# Patient Record
Sex: FEMALE | Race: WHITE | NOT HISPANIC OR LATINO | Employment: FULL TIME | URBAN - METROPOLITAN AREA
[De-identification: names, ages, dates, MRNs, and addresses within clinical notes are randomized per-mention and may not be internally consistent; named-entity substitution may affect disease eponyms.]

---

## 2017-06-06 ENCOUNTER — ALLSCRIPTS OFFICE VISIT (OUTPATIENT)
Dept: OTHER | Facility: OTHER | Age: 37
End: 2017-06-06

## 2017-06-06 DIAGNOSIS — N92.6 IRREGULAR MENSTRUATION: ICD-10-CM

## 2017-10-16 ENCOUNTER — ALLSCRIPTS OFFICE VISIT (OUTPATIENT)
Dept: OTHER | Facility: OTHER | Age: 37
End: 2017-10-16

## 2017-10-16 LAB
BACTERIA UR QL AUTO: NORMAL
CLUE CELL (HISTORICAL): NORMAL
HYPHAL YEAST (HISTORICAL): NEGATIVE
KOH PREP (HISTORICAL): NEGATIVE
PH FLUID (HISTORICAL): NORMAL
PH UR STRIP.AUTO: NORMAL [PH]
TRICHOMONAS (HISTORICAL): NORMAL
YEAST (HISTORICAL): NEGATIVE

## 2017-10-18 ENCOUNTER — LAB CONVERSION - ENCOUNTER (OUTPATIENT)
Dept: OTHER | Facility: OTHER | Age: 37
End: 2017-10-18

## 2017-10-18 LAB — CLINICAL COMMENT (HISTORICAL): NORMAL

## 2017-11-01 NOTE — PROGRESS NOTES
Assessment    1  Bacterial vaginosis (616 10,041 9) (N76 0,B96 89)   2  Contact with and (suspected) exposure to infections with a predominantly sexual mode of transmission (V01 6) (Z20 2)    Plan  Bacterial vaginosis    · MetroNIDAZOLE 500 MG Oral Tablet; TAKE 1 TABLET TWICE DAILY UNTILFINISHED   Rx By: Keturah Mccann; Dispense: 7 Days ; #:14 Tablet; Refill: 0;For: Bacterial vaginosis; JIMY = N; Sent To: [facility]/PHARMACY #[id number]  [facility]; Status:Complete;   Done: 49GPC7710 12:00PM   Performed: In Office; JQM:66ERQ6281;POWTRDP; For:Bacterial vaginosis; Ordered By:Maria Ines Rosario;   · [address] [facility]; Status:Complete;   Done: 98MNQ7587 12:00PM   Performed: In Office; Due:2017;Ordered; Today;vaginosis; Ordered By:Maria Ines Rosario;   · Follow-up PRN Evaluation and Treatment  Follow-up  Status: Complete  Done:2017 12:00PM   Ordered; Bacterial vaginosis; Ordered By: Keturah Mccann Performed:  Due: 83LWC6220  Contact with and (suspected) exposure to infections with a predominantly sexual modeof transmission    · (Q) CHLAM/N GONO PROBE W/REFL ENDOCX OR MALE URETHRA; Status:Active; Requested RPP:[id number];    Perform:[facility]; Due:2018; Ordered;with and (suspected) exposure to infections with a predominantly sexual mode of transmission; Ordered By:Maria Ines Rosario;   · Using a latex condom can help prevent pregnancy  It can also help to prevent the spreadof sexually transmitted infections ; Status:Complete;   Done: 61WXR1610 12:00PM   Ordered;with and (suspected) exposure to infections with a predominantly sexual mode of transmission; Ordered By:Maria Ines Rosario;    Discussion/Summary  Goals and Barriers: The patient has the current Goals: Resolutions of symptoms  The patent has the current Barriers: None  Patient's Capacity to Self-Care: Patient is able to Self-Care        Chief Complaint  Chief Complaint Free Text Note Form: I think I have an infection      History of Present Illness  HPI: Pt is a 40 y o   with LMP 10/2/2017 who presents for evaluation secondary to vaginal discharge  She reports she has a white vaginal discharge  She reports it has a slight odor  She reports it smells musty  SHe reports she has a new sexual partner x 4 weeks  SHe reports no condom use  She denies any itching or burning  She denies fevers/chills  She denies pain with intercourse  SHe is concerned she may have an infection or sti  Review of Systems  Focused-Female:  Constitutional: No fever, no chills, feels well, no tiredness, no recent weight gain or loss  ENT: no ear ache, no loss of hearing, no nosebleeds or nasal discharge, no sore throat or hoarseness  Cardiovascular: no complaints of slow or fast heart rate, no chest pain, no palpitations, no leg claudication or lower extremity edema  Respiratory: no complaints of shortness of breath, no wheezing, no dyspnea on exertion, no orthopnea or PND  Breasts: no complaints of breast pain, breast lump or nipple discharge  Gastrointestinal: no complaints of abdominal pain, no constipation, no nausea or diarrhea, no vomiting, no bloody stools  Genitourinary: as noted in HPI  Musculoskeletal: no complaints of arthralgia, no myalgia, no joint swelling or stiffness, no limb pain or swelling  Integumentary: no complaints of skin rash or lesion, no itching or dry skin, no skin wounds  Neurological: no complaints of headache, no confusion, no numbness or tingling, no dizziness or fainting  Active Problems  1  Cervical cancer screening (V76 2) (Z12 4)   2  Depression (311) (F32 9)   3  Dietary calcium deficiency (269 3) (E58)   4  Irregular menstrual bleeding (626 4) (N92 6)   5  History of Macrosomia (766 0) (P08 0)   6  Oral contraceptive pill surveillance (V25 41) (Z30 41)   7  Overweight (BMI 25 0-29 9) (278 02) (E66 3)   8  PCOS (polycystic ovarian syndrome) (256 4) (E28 2)   9  Well female exam with routine gynecological exam (V72 31) (Z01 419)    Past Medical History    1  Denied: History of Abnormal Pap smear of cervix   2  History of Common migraine without aura (346 10) (G43 009)   3  History of Genetic screening (V82 79) (Z13 79)   4  Denied: History of Herpes simplex   5  History of metrorrhagia (V13 29) (Z87 42)   6  History of pregnancy (V13 29)   7  History of Macrosomia (766 0) (P08 0)   8  History of Menarche (V21 8)   9  History of Obesity (BMI 30 0-34 9) (278 00) (E66 9)   10  History of Post partum depression (864 80,214) (F53)   11  History of Varicella (052 9) (B01 9)  Active Problems And Past Medical History Reviewed: The active problems and past medical history were reviewed and updated today  Surgical History  1  History of Breast Surgery Reduction Procedure   2  History of Cholecystectomy   3  History of Dilation And Curettage   4  History of Surgically Induced   Surgical History Reviewed: The surgical history was reviewed and updated today  Family History  Mother    1  Family history of Hypertension   2  Family history of Kidney stones  Father    3  Family history of Hypercholesterolemia   4  Family history of Hypertension  Maternal Grandmother    5  Family history of Chronic kidney disease   6  Family history of Type 2 diabetes mellitus  Maternal Grandfather    7  Family history of Cardiac ischemia   8  Family history of Stroke   9  Family history of Type 2 diabetes mellitus  Maternal Great Grandfather    10  Family history of Kidney stones  Maternal Relatives    11  Family history of Throat cancer  Family History Reviewed: The family history was reviewed and updated today  Social History     · Alcohol use (V49 89) (Z78 9)   ·    · Exercise habits   · Former smoker (V15 82) (W16 325)   · High school graduate   · No drug use   · No preference on Mormonism beliefs   · Occupation  Social History Reviewed: The social history was reviewed and updated today  The social history was reviewed and is unchanged  Current Meds   1  Imitrex 50 MG Oral Tablet (SUMAtriptan Succinate); Therapy: (Recorded:06Jun2017) to Recorded   2  Sprintec 28 0 25-35 MG-MCG Oral Tablet; TAKE 1 TABLET DAILY; Therapy: 94WFC6594 to (Tacy Pound)  Requested for: 63CVO0246; Last Rx:06Jun2017 Ordered   3  Topamax 50 MG Oral Tablet (Topiramate); TAKE 1 TABLET TWICE DAILY; Therapy: (Recorded:06Jun2017) to Recorded   4  Wellbutrin  MG Oral Tablet Extended Release 24 Hour (BuPROPion HCl ER (XL)); Therapy: (Recorded:06Jun2017) to Recorded    Allergies  1  Augmentin    Vitals  Vital Signs    Recorded: 30OVM9998 45:54EP   Systolic 028, Sitting   Diastolic 80, Sitting   Height 5 ft 4 5 in   Weight 173 lb    BMI Calculated 29 24   BSA Calculated 1 85   LMP 02Oct2017       Physical Exam   Constitutional  General appearance: No acute distress, well appearing and well nourished  Neck  Neck: Normal, supple, trachea midline, no masses  Pulmonary  Respiratory effort: No increased work of breathing or signs of respiratory distress  Genitourinary  External genitalia: Normal and no lesions appreciated  Vagina: Normal, no lesions or dryness appreciated  Vagina: moderate,-- white-- and-- thin vaginal discharge, but-- normal   Urethra: Normal    Urethral meatus: Normal    Bladder: Normal, soft, non-tender and no prolapse or masses appreciated  Cervix: Normal, no palpable masses  Examination of the cervix revealed normal findings,-- no polyps-- and-- no masses  Uterus: Normal, non-tender, not enlarged, and no palpable masses  Adnexa/parametria: Normal, non-tender and no fullness or masses appreciated  Abdomen  Abdomen: Normal, non-tender, and no organomegaly noted  Liver and spleen: No hepatomegaly or splenomegaly  Examination for hernias: No hernias appreciated  Skin  Skin and subcutaneous tissue: Normal skin turgor and no rashes     Psychiatric  Orientation to person, place, and time: Normal    Mood and affect: Normal        Signatures   Electronically signed by : MARTHA Castillo ; Oct 16 2017 12:01PM EST                       (Author)

## 2017-12-08 ENCOUNTER — ALLSCRIPTS OFFICE VISIT (OUTPATIENT)
Dept: OTHER | Facility: OTHER | Age: 37
End: 2017-12-08

## 2017-12-08 LAB
BACTERIA UR QL AUTO: NORMAL
CLUE CELL (HISTORICAL): NORMAL
HYPHAL YEAST (HISTORICAL): NEGATIVE
KOH PREP (HISTORICAL): NORMAL
PH FLUID (HISTORICAL): NORMAL
TRICHOMONAS (HISTORICAL): NEGATIVE
YEAST (HISTORICAL): NEGATIVE

## 2017-12-09 NOTE — PROGRESS NOTES
Assessment  1  Bacterial vaginosis (616 10,041 9) (N76 0,B96 89)    Plan  Bacterial vaginosis    · Tinidazole 500 MG Oral Tablet; Take 2 tablets once daily x 5 days   Rx By: Audrey Vinson; Dispense: 0 Days ; #:10 Tablet; Refill: 0;For: Bacterial vaginosis; JIMY = N; Sent To: [facility]/PHARMACY #[id number]  · [address] 30 Body Fluid - POC; Status:Complete;   Done: 45LJB1616 11:12AM   Performed: In Office; Due:39Ggd1805;Ordered; For:Bacterial vaginosis; Ordered By:Johnson Rosario;   · [address]; Status:Complete;   Done: 20WQJ6971 11:12AM   Performed: In Office; XYD:40RDR3453;SPPOZIR; Today;vaginosis; Ordered By:Johnson Rosario;   · Follow-up PRN Evaluation and Treatment  Follow-up  Status: Complete  Done:95Mnj4261 11:13AM   Ordered; Bacterial vaginosis; Ordered By: Audrey Vinson Performed:  Due: 64DSN2490    Discussion/Summary  Goals and Barriers: The patient has the current Goals: Resolution of sx  The patent has the current Barriers: None  Patient's Capacity to Self-Care: Patient is able to Self-Care  Chief Complaint  Chief Complaint Free Text Note Form: I think I have an infection      History of Present Illness  HPI: Pt is a 40 y o  with LMP 17 who presents for evaluation secondary to suspected infection  She reports after treatment for BV last time she developed what she thinks is a yeast infection  She self treated with Monistat and her symptoms resolved  She reports she had a normal discharge and things were fine until 2 week ago  she reports that she has noticed a slight odor to her discharge that is intermittent  She reports that she not noticed exacerbating or alleviating factors  She reports no other OTC treatments  she reports the discharge is white with a slight yellow tinge  She reports the it is sticky in nature  She denies any vaginal or vulvar itching or burning   She reports that her menses have been normal  She denies any new sexual partners      Review of Systems  Focused-Female:  Constitutional: No fever, no chills, feels well, no tiredness, no recent weight gain or loss  ENT: no ear ache, no loss of hearing, no nosebleeds or nasal discharge, no sore throat or hoarseness  Cardiovascular: no complaints of slow or fast heart rate, no chest pain, no palpitations, no leg claudication or lower extremity edema  Respiratory: no complaints of shortness of breath, no wheezing, no dyspnea on exertion, no orthopnea or PND  Breasts: no complaints of breast pain, breast lump or nipple discharge  Gastrointestinal: no complaints of abdominal pain, no constipation, no nausea or diarrhea, no vomiting, no bloody stools  Genitourinary: as noted in HPI  Musculoskeletal: no complaints of arthralgia, no myalgia, no joint swelling or stiffness, no limb pain or swelling  Integumentary: no complaints of skin rash or lesion, no itching or dry skin, no skin wounds  Neurological: no complaints of headache, no confusion, no numbness or tingling, no dizziness or fainting  Active Problems  1  Bacterial vaginosis (616 10,041 9) (N76 0,B96 89)   2  Cervical cancer screening (V76 2) (Z12 4)   3  Depression (311) (F32 9)   4  Dietary calcium deficiency (269 3) (E58)   5  History of Macrosomia (766 0) (P08 0)   6  Oral contraceptive pill surveillance (V25 41) (Z30 41)   7  Overweight (BMI 25 0-29 9) (278 02) (E66 3)   8  PCOS (polycystic ovarian syndrome) (256 4) (E28 2)   9  Well female exam with routine gynecological exam (V72 31) (Z01 419)    Past Medical History    1  Denied: History of Abnormal Pap smear of cervix   2  History of Common migraine without aura (346 10) (G43 009)   3  History of Contact with and (suspected) exposure to infections with a predominantly sexual mode of transmission (V01 6) (Z20 2)   4  History of Genetic screening (V82 79) (Z13 79)   5  Denied: History of Herpes simplex   6  History of irregular menstrual cycles (V13 29) (Z87 42)   7  History of metrorrhagia (V13 29) (Z87 42)   8   History of pregnancy (V13 29)   9  History of Macrosomia (766 0) (P08 0)   10  History of Menarche (V21 8)   11  History of Obesity (BMI 30 0-34 9) (278 00) (E66 9)   12  History of Post partum depression (130 87,976) (F53)   13  History of Varicella (052 9) (B01 9)  Active Problems And Past Medical History Reviewed: The active problems and past medical history were reviewed and updated today  Surgical History  1  History of Breast Surgery Reduction Procedure   2  History of Cholecystectomy   3  History of Dilation And Curettage   4  History of Surgically Induced   Surgical History Reviewed: The surgical history was reviewed and updated today  Family History  Mother    1  Family history of Hypertension   2  Family history of Kidney stones  Father    3  Family history of Hypercholesterolemia   4  Family history of Hypertension  Maternal Grandmother    5  Family history of Chronic kidney disease   6  Family history of Type 2 diabetes mellitus  Maternal Grandfather    7  Family history of Cardiac ischemia   8  Family history of Stroke   9  Family history of Type 2 diabetes mellitus  Maternal Great Grandfather    10  Family history of Kidney stones  Maternal Relatives    11  Family history of Throat cancer  Family History Reviewed: The family history was reviewed and updated today  Social History     · Alcohol use (V49 89) (Z78 9)   ·    · Exercise habits   · Former smoker (V15 82) (N63 638)   · High school graduate   · No drug use   · No preference on Baptism beliefs   · Occupation  Social History Reviewed: The social history was reviewed and updated today  The social history was reviewed and is unchanged  Current Meds   1  Imitrex 50 MG Oral Tablet (SUMAtriptan Succinate); Therapy: (Recorded:2017) to Recorded   2  Sprintec 28 0 25-35 MG-MCG Oral Tablet; TAKE 1 TABLET DAILY; Therapy: 29JIW2468 to (Susan Winston)  Requested for: 50MFI7957; Last Rx:2017 Ordered   3  Topamax 50 MG Oral Tablet (Topiramate); TAKE 1 TABLET TWICE DAILY; Therapy: (Recorded:06Jun2017) to Recorded   4  Wellbutrin  MG Oral Tablet Extended Release 24 Hour (BuPROPion HCl ER (XL)); Therapy: (Recorded:06Jun2017) to Recorded    Allergies  1  Augmentin    Vitals  Vital Signs    Recorded: 39ASI4888 38:55CS   Systolic 133, LUE, Sitting   Diastolic 74, LUE, Sitting   Height 5 ft 4 5 in   Weight 178 lb    BMI Calculated 30 08   BSA Calculated 1 87   LMP 75ODM8351       Physical Exam   Constitutional  General appearance: No acute distress, well appearing and well nourished  overweight  Neck  Neck: Normal, supple, trachea midline, no masses  Pulmonary  Respiratory effort: No increased work of breathing or signs of respiratory distress  Genitourinary  External genitalia: Normal and no lesions appreciated  Vagina: Normal, no lesions or dryness appreciated  Vagina: moderate,-- white-- and-- thin vaginal discharge, but-- normal   Urethra: Normal    Urethral meatus: Normal    Bladder: Normal, soft, non-tender and no prolapse or masses appreciated  Cervix: Normal, no palpable masses  Examination of the cervix revealed normal findings,-- no polyps-- and-- no masses  Uterus: Normal, non-tender, not enlarged, and no palpable masses  Adnexa/parametria: Normal, non-tender and no fullness or masses appreciated  Abdomen  Abdomen: Normal, non-tender, and no organomegaly noted  Liver and spleen: No hepatomegaly or splenomegaly  Examination for hernias: No hernias appreciated  Skin  Skin and subcutaneous tissue: Normal skin turgor and no rashes     Psychiatric  Orientation to person, place, and time: Normal    Mood and affect: Normal        Signatures   Electronically signed by : MARTHA Bertrand ; Dec  8 2017 11:15AM EST                       (Author)

## 2018-01-10 NOTE — PROGRESS NOTES
Active Problems    1  Bacterial vaginosis (616 10,041 9) (N76 0,B96 89)   2  Cervical cancer screening (V76 2) (Z12 4)   3  Depression (311) (F32 9)   4  Dietary calcium deficiency (269 3) (E58)   5  History of Macrosomia (766 0) (P08 0)   6  Obesity (BMI 30 0-34 9) (278 00) (E66 9)   7  Oral contraceptive pill surveillance (V25 41) (Z30 41)   8  PCOS (polycystic ovarian syndrome) (256 4) (E28 2)   9  Well female exam with routine gynecological exam (V72 31) (Z01 419)    Current Meds   1  Butalbital-APAP-Caffeine -40 MG Oral Tablet; Therapy: 47EVF5398 to Recorded   2  FLUoxetine HCl - 10 MG Oral Capsule; Therapy: 26AAZ4876 to Recorded   3  MetroNIDAZOLE 500 MG Oral Tablet; TAKE 1 TABLET TWICE DAILY UNTIL FINISHED,   no ETOH use during and for 2 days following treatment; Therapy: 67EHK7066 to (Evaluate:42Rte2645)  Requested for: 71HRT3860; Last   Rx:28Twn2550 Ordered   4  Sprintec 28 0 25-35 MG-MCG Oral Tablet; TAKE 1 TABLET DAILY; Therapy: 40VTS4370 to (Evaluate:00Oco8332)  Requested for: 46Fga8608; Last   Rx:61Cxu8320 Ordered    Allergies    1   Augmentin    Message   Date: 18 Jul 2016 10:24 AM EST, Recorded By: Damari Browne For: Yaquelin Willis   Caller: Myles Brady, Self   Phone: [phone] (Home), [phone] (Work)   Reason: Medical Complaint   Pt called the office stating that the infection has gone away but the outside area is raw and irritated    Tried monistat on outside got no relief      As per BEO to bring her in for appt      Pt will call back         Signatures   Electronically signed by : MARTHA Acuña ; Jul 18 2016  7:45PM EST                       (Co-participant)

## 2018-01-11 NOTE — RESULT NOTES
Verified Results  *(Q) SURESWAB(R), HSV TYPE 1/2 DNA, REAL TIME PCR 48NPH4768 12:00AM Makayla Siemens     Test Name Result Flag Reference   HSV 1 DNA Not Detected  Not Detected   HSV 2 DNA Not Detected  Not Detected   This test was developed and its analytical  performance characteristics have been determined  by [place], [place]  It has not been cleared or approved by the FDA  This  assay has been validated pursuant to the CLIA  regulations and is used for clinical purposes

## 2018-01-12 VITALS
SYSTOLIC BLOOD PRESSURE: 114 MMHG | HEIGHT: 65 IN | WEIGHT: 177 LBS | BODY MASS INDEX: 29.49 KG/M2 | DIASTOLIC BLOOD PRESSURE: 68 MMHG

## 2018-01-14 VITALS
WEIGHT: 173 LBS | SYSTOLIC BLOOD PRESSURE: 110 MMHG | HEIGHT: 65 IN | DIASTOLIC BLOOD PRESSURE: 80 MMHG | BODY MASS INDEX: 28.82 KG/M2

## 2018-01-15 NOTE — MISCELLANEOUS
Message  Patient is scheduled for her annual exam on 4/15/16  Pt requested one month refill of her o/c until that time  One month sent her pharmacy as requested  Pt is aware that no more refills will be given until she is seen  Active Problems    1  Cervical cancer screening (V76 2) (Z12 4)   2  Depression (311) (F32 9)   3  Dietary calcium deficiency (269 3) (E58)   4  History of Macrosomia (766 0) (P08 0)   5  Metrorrhagia (626 6) (N92 1)   6  Obesity (BMI 30 0-34 9) (278 00) (E66 9)   7  Oral contraceptive pill surveillance (V25 41) (Z30 41)   8  PCOS (polycystic ovarian syndrome) (256 4) (E28 2)   9  Well female exam with routine gynecological exam (V72 31) (Z01 419)    Current Meds   1  Butalbital-APAP-Caffeine -40 MG Oral Tablet; Therapy: 54IGU3134 to Recorded   2  FLUoxetine HCl - 10 MG Oral Capsule; Therapy: 28IAI3624 to Recorded   3  MetFORMIN HCl  MG Oral Tablet Extended Release 24 Hour; Therapy: 99RIM8179 to Recorded   4  Sprintec 28 0 25-35 MG-MCG Oral Tablet; TAKE 1 TABLET DAILY; Therapy: 55ZIR5558 to (([phone] 856-400)  Requested for: 24WGN0264; Last   Rx:26Jun2015 Ordered    Allergies    1   Augmentin    Plan  Metrorrhagia, Oral contraceptive pill surveillance    · Sprintec 28 0 25-35 MG-MCG Oral Tablet; TAKE 1 TABLET DAILY    Signatures   Electronically signed by : MARTHA Mcduffie ; Mar 30 2016  2:19PM EST                       (Author)

## 2018-01-15 NOTE — RESULT NOTES
Verified Results  (Q) TEST AUTHORIZATION 22HDS3653 12:00AM Kathy Michael     Test Name Result Flag Reference   TEST(S) ORDERED ON$REQUISITION      SURESWAB(R), HSV TYPE 1/2   TEST CODE: [id number]     CLIENT CONTACT: SUZY WILCOX     REPORT ALWAYS MESSAGE$SIGNATURE See Below     The laboratory testing on this patient was verbally requested  or confirmed by the ordering physician or his or her authorized  representative after contact with an employee of [employer]  Federal regulations require that we maintain on file written  authorization for all laboratory testing  Accordingly we are asking  that the ordering physician or his or her authorized representative  sign a copy of this report and promptly return it to the client    Signature:____________________________________________________     (Q) HERPES SIMPLEX VIRUS 1 AND 2, PCR 66HUV8685 12:00AM Kathy Michael     Test Name Result Flag Reference   SOURCE [place]     **********************************   * Test not performed  *   * No suitable specimen received   *   **********************************

## 2018-01-22 VITALS
WEIGHT: 178 LBS | BODY MASS INDEX: 29.66 KG/M2 | DIASTOLIC BLOOD PRESSURE: 74 MMHG | HEIGHT: 65 IN | SYSTOLIC BLOOD PRESSURE: 118 MMHG

## 2018-05-02 ENCOUNTER — TELEPHONE (OUTPATIENT)
Dept: OBGYN CLINIC | Facility: CLINIC | Age: 38
End: 2018-05-02

## 2018-05-02 NOTE — TELEPHONE ENCOUNTER
Patient called to schedule her annual exam and needs a refill of her OC Sprintec 0 25-35 MG-MCG to get her to her appointment which is scheduled for 07/02/2018  Sent one month supply and one refill to patients pharmacy in chart

## 2018-05-24 ENCOUNTER — OFFICE VISIT (OUTPATIENT)
Dept: OBGYN CLINIC | Facility: CLINIC | Age: 38
End: 2018-05-24
Payer: COMMERCIAL

## 2018-05-24 VITALS — WEIGHT: 177 LBS | DIASTOLIC BLOOD PRESSURE: 76 MMHG | SYSTOLIC BLOOD PRESSURE: 120 MMHG | BODY MASS INDEX: 29.91 KG/M2

## 2018-05-24 DIAGNOSIS — B96.89 BACTERIAL VAGINITIS: ICD-10-CM

## 2018-05-24 DIAGNOSIS — N76.0 BACTERIAL VAGINITIS: ICD-10-CM

## 2018-05-24 DIAGNOSIS — Z20.2 POSSIBLE EXPOSURE TO STD: ICD-10-CM

## 2018-05-24 DIAGNOSIS — N89.8 VAGINAL ODOR: ICD-10-CM

## 2018-05-24 DIAGNOSIS — N89.8 VAGINAL DISCHARGE: Primary | ICD-10-CM

## 2018-05-24 DIAGNOSIS — S30.854A: ICD-10-CM

## 2018-05-24 PROBLEM — N92.6 IRREGULAR MENSTRUAL BLEEDING: Status: ACTIVE | Noted: 2017-06-06

## 2018-05-24 PROBLEM — G43.009 MIGRAINE WITHOUT AURA AND WITHOUT STATUS MIGRAINOSUS, NOT INTRACTABLE: Status: ACTIVE | Noted: 2018-05-14

## 2018-05-24 PROBLEM — E66.3 OVERWEIGHT (BMI 25.0-29.9): Status: ACTIVE | Noted: 2017-06-06

## 2018-05-24 LAB — SL AMB POCT WET MOUNT: ABNORMAL

## 2018-05-24 PROCEDURE — 87210 SMEAR WET MOUNT SALINE/INK: CPT | Performed by: OBSTETRICS & GYNECOLOGY

## 2018-05-24 PROCEDURE — 99214 OFFICE O/P EST MOD 30 MIN: CPT | Performed by: OBSTETRICS & GYNECOLOGY

## 2018-05-24 RX ORDER — DICLOFENAC POTASSIUM 50 MG/1
TABLET, FILM COATED ORAL
COMMUNITY
Start: 2018-03-21

## 2018-05-24 RX ORDER — SUMATRIPTAN 100 MG/1
TABLET, FILM COATED ORAL
COMMUNITY
Start: 2018-05-04 | End: 2018-08-07

## 2018-05-24 RX ORDER — BUTALBITAL, ACETAMINOPHEN AND CAFFEINE 50; 325; 40 MG/1; MG/1; MG/1
1-2 TABLET ORAL EVERY 6 HOURS
COMMUNITY
Start: 2018-02-28 | End: 2018-05-24

## 2018-05-24 RX ORDER — CLINDAMYCIN PHOSPHATE 20 MG/G
1 CREAM VAGINAL
Qty: 40 G | Refills: 0 | Status: SHIPPED | OUTPATIENT
Start: 2018-05-24 | End: 2018-07-02 | Stop reason: CLARIF

## 2018-05-24 RX ORDER — NORGESTIMATE AND ETHINYL ESTRADIOL 0.25-0.035
1 KIT ORAL
COMMUNITY
Start: 2015-05-23 | End: 2018-06-22 | Stop reason: SDUPTHER

## 2018-05-24 RX ORDER — BUPROPION HYDROCHLORIDE 150 MG/1
150 TABLET ORAL
COMMUNITY
Start: 2017-12-04 | End: 2018-07-02 | Stop reason: CLARIF

## 2018-05-24 RX ORDER — SUMATRIPTAN 50 MG/1
TABLET, FILM COATED ORAL
COMMUNITY
End: 2018-05-24

## 2018-05-24 RX ORDER — CHOLECALCIFEROL (VITAMIN D3) 25 MCG
1000 CAPSULE ORAL
COMMUNITY

## 2018-05-24 RX ORDER — CEFADROXIL 500 MG/1
CAPSULE ORAL
COMMUNITY
Start: 2018-05-23 | End: 2018-07-02 | Stop reason: CLARIF

## 2018-05-24 NOTE — PROGRESS NOTES
Patient is a 45 y o  [id number] with Patient's last menstrual period was 05/15/2018 (exact date)  who presents requesting evaluation of suspected vaginal infection  She reports she was diagnosed with BV and was treated  She reports she completed therapy and then felt like she still had symptoms, so she saw her PCP who renewed her flagyl, but she developed severe migraines and had to go to the ED and they discontinued her flagyl and told her that "it will run its course"  She reports her symptoms never resolved so she tried multiple probiotics, rephresh, tea tree oil, and then she douched with peroxide with distilled water but she never got any improvement  She reports she also had a recent piercing that was misplaced and might need to be surgically removed  She reports her vaginal discharge is white  She reports it has an odor of fish  She denies vaginal itching  She reports vaginal burning  She denies fevers/chills  She reports she is currently on abx for a uti  She reports nausea      Past Medical History:   Diagnosis Date    Chickenpox     Migraine without aura     Oral herpes     PCOS (polycystic ovarian syndrome)        Past Surgical History:   Procedure Laterality Date    CHOLECYSTECTOMY      DILATION AND CURETTAGE, DIAGNOSTIC / THERAPEUTIC      miscarriage x 2    REDUCTION MAMMAPLASTY      WISDOM TOOTH EXTRACTION         OB History    Para Term  AB Living   4 2 2   2     SAB TAB Ectopic Multiple Live Births   2              # Outcome Date GA Lbr Izaiah/2nd Weight Sex Delivery Anes PTL Lv   4 SAB            3 SAB            2 Term            1 Term               Obstetric Comments   Menarche: 15       Gyn HX:  pcos and irregular menses      Current Outpatient Prescriptions:     buPROPion (WELLBUTRIN XL) 150 mg 24 hr tablet, Take 150 mg by mouth, Disp: , Rfl:     cefadroxil (DURICEF) 500 mg capsule, , Disp: , Rfl:     Cholecalciferol (VITAMIN D-3) 1000 units CAPS, Take 1,000 Units by mouth, Disp: , Rfl:     diclofenac potassium (CATAFLAM) 50 mg tablet, 50mg 1-2 times a day as needed for pain , Disp: , Rfl:     norgestimate-ethinyl estradiol (SPRINTEC 28) 0 25-35 MG-MCG per tablet, Take 1 tablet by mouth, Disp: , Rfl:     SUMAtriptan (IMITREX) 100 mg tablet, TAKE 1 TABLET AS NEEDED FOR MIGRAINE, MAY REPEAT IN 2 HOURS IF NEEDED  MAX OF 200MG/24 HRS, Disp: , Rfl:     Topiramate (TOPAMAX PO), Take by mouth, Disp: , Rfl:     clindamycin (CLEOCIN) 2 % vaginal cream, Insert 1 applicator into the vagina daily at bedtime, Disp: 40 g, Rfl: 0    Allergies   Allergen Reactions    Amoxicillin-Pot Clavulanate Hives    Metformin      Stomach Side effects       Social History     Social History    Marital status:      Spouse name: N/A    Number of children: 2    Years of education: hs, vocational     Occupational History          Social History Main Topics    Smoking status: Never Smoker    Smokeless tobacco: Never Used    Alcohol use 0 6 oz/week     1 Glasses of wine per week    Drug use: No    Sexual activity: Yes     Partners: Male     Birth control/ protection: Pill      Comment: lifetime partners: 7     Other Topics Concern    None     Social History Narrative    Catholic: no preference    Accepts blood products       Family History   Problem Relation Age of Onset    Hypertension Mother     Hypertension Father     Anxiety disorder Brother     Thyroid disease Maternal Grandmother     Hypertension Maternal Grandmother     Hyperlipidemia Maternal Grandmother     Diabetes Maternal Grandmother     Hypertension Maternal Grandfather     Hyperlipidemia Maternal Grandfather     Heart disease Maternal Grandfather        Review of Systems   Constitutional: Negative for chills, fatigue, fever and unexpected weight change  HENT: Negative for congestion, mouth sores and sore throat  Respiratory: Negative for cough, chest tightness, shortness of breath and wheezing  Cardiovascular: Negative for chest pain and palpitations  Gastrointestinal: Positive for nausea  Negative for abdominal distention, abdominal pain, constipation, diarrhea and vomiting  Endocrine: Negative for cold intolerance and heat intolerance  Genitourinary: Positive for dysuria, vaginal discharge and vaginal pain  Negative for dyspareunia, genital sores, menstrual problem, pelvic pain and vaginal bleeding  Musculoskeletal: Negative for arthralgias  Skin: Negative for color change and rash  Neurological: Negative for dizziness, light-headedness and headaches  Hematological: Negative for adenopathy  Blood pressure 120/76, weight 80 3 kg (177 lb), last menstrual period 05/15/2018, not currently breastfeeding  and Body mass index is 29 91 kg/m²  Physical Exam   Constitutional: She is oriented to person, place, and time  She appears well-developed and well-nourished  HENT:   Head: Normocephalic and atraumatic  Eyes: Conjunctivae and EOM are normal    Neck: Normal range of motion  Pulmonary/Chest: Effort normal    Abdominal: Soft  She exhibits no distension and no mass  There is no tenderness  There is no rebound and no guarding  Musculoskeletal: Normal range of motion  She exhibits no edema or tenderness  Neurological: She is alert and oriented to person, place, and time  Skin: Skin is warm  No rash noted  No erythema  Psychiatric: She has a normal mood and affect   Her behavior is normal  Judgment and thought content normal         vulva: normal external genitalia for age, no lesions, masses, epithelial changes, or exudate and entrapped foreign body in clitorus (piercing)  vagina: color pink, rugae  well formed rugae, discharge  yellow and odiferous and bleeding  without any bleeding  cervix: parous and no lesions   uterus: NSSC, AF, NT, mobile  adnexa: no masses or tenderness    Wet Mount/KOH Results:  Bacteria: few  Clue cells: many  Trichomonas: none  Yeast (with or without hyphae): absent  pH: blue  KOH: negative for yeas        A/P:  Pt is a 45 y o  [id number] with      Diagnoses and all orders for this visit:    Vaginal discharge  -     POCT wet mount  -     clindamycin (CLEOCIN) 2 % vaginal cream; Insert 1 applicator into the vagina daily at bedtime  -     Sureswab(R), Vaginosis/Vaginitis Plus    Vaginal odor  -     POCT wet mount  -     clindamycin (CLEOCIN) 2 % vaginal cream; Insert 1 applicator into the vagina daily at bedtime  -     Sureswab(R), Vaginosis/Vaginitis Plus    Possible exposure to STD  -     POCT wet mount  -     Sureswab(R), Vaginosis/Vaginitis Plus    Bacterial vaginitis  -     POCT wet mount  -     clindamycin (CLEOCIN) 2 % vaginal cream; Insert 1 applicator into the vagina daily at bedtime  -     Sureswab(R), Vaginosis/Vaginitis Plus    Foreign body in vulva, initial encounter      Schedule removal

## 2018-05-27 LAB
A VAGINAE DNA VAG NAA+PROBE-LOG#: 7.4 LOG (CELLS/ML)
C GLABRATA DNA VAG QL NAA+PROBE: NOT DETECTED
C TRACH RRNA SPEC QL NAA+PROBE: NOT DETECTED
CANDIDA DNA VAG QL NAA+PROBE: DETECTED
G VAGINALIS DNA VAG NAA+PROBE-LOG#: >8 LOG (CELLS/ML)
LACTOBACILLUS DNA VAG NAA+PROBE-LOG#: NOT DETECTED LOG (CELLS/ML)
MEGASPHAERA SP DNA VAG NAA+PROBE-LOG#: 7.9 LOG (CELLS/ML)
N GONORRHOEA RRNA SPEC QL NAA+PROBE: NOT DETECTED
SL AMB BV CATEGORY:: ABNORMAL
SL AMB C. PARAPSILOSIS, DNA: NOT DETECTED
SL AMB C. TROPICALIS, DNA: NOT DETECTED
T VAGINALIS RRNA SPEC QL NAA+PROBE: NOT DETECTED

## 2018-05-29 ENCOUNTER — TELEPHONE (OUTPATIENT)
Dept: OBGYN CLINIC | Facility: CLINIC | Age: 38
End: 2018-05-29

## 2018-05-29 DIAGNOSIS — N76.0 BACTERIAL VAGINITIS: ICD-10-CM

## 2018-05-29 DIAGNOSIS — B37.3 YEAST VAGINITIS: Primary | ICD-10-CM

## 2018-05-29 DIAGNOSIS — B96.89 BACTERIAL VAGINITIS: ICD-10-CM

## 2018-05-29 RX ORDER — FLUCONAZOLE 150 MG/1
150 TABLET ORAL ONCE
Qty: 1 TABLET | Refills: 0 | Status: SHIPPED | OUTPATIENT
Start: 2018-05-29 | End: 2018-05-29

## 2018-05-29 NOTE — TELEPHONE ENCOUNTER
Results received this morning   she also has a yeast infection  Rx sent to pharmacy in chart  If no improvement to call back  She has both bv and yeast  Thanks!

## 2018-05-29 NOTE — PROGRESS NOTES
Please notify the patient that she has both BV and yeast  She is negative for chlamydia, gonorrhea or trichomonas   Meds have been sent to the pharmacy in the patient's chart for yeast infection, she already received treatment for the BV

## 2018-06-01 ENCOUNTER — TELEPHONE (OUTPATIENT)
Dept: OBGYN CLINIC | Facility: CLINIC | Age: 38
End: 2018-06-01

## 2018-06-04 ENCOUNTER — TELEPHONE (OUTPATIENT)
Dept: OBGYN CLINIC | Facility: CLINIC | Age: 38
End: 2018-06-04

## 2018-06-04 DIAGNOSIS — B37.3 YEAST VAGINITIS: Primary | ICD-10-CM

## 2018-06-04 RX ORDER — FLUCONAZOLE 150 MG/1
150 TABLET ORAL ONCE
Qty: 1 TABLET | Refills: 0 | Status: SHIPPED | OUTPATIENT
Start: 2018-06-04 | End: 2018-06-04

## 2018-06-04 NOTE — TELEPHONE ENCOUNTER
I called the patient back  She reports that she feels that her BV has resolved but she still has significant vulvar pruritus  She was offered terconazole vs repeat diflucan  She reports she would like a second round of oral diflucan   Pt will call if symptoms persist

## 2018-06-04 NOTE — TELEPHONE ENCOUNTER
Per our discussion Friday, patient was noted to have a yeast infection as well  Pt was prescribed diflucan and she was to call back Monday in follow up  Thanks!

## 2018-06-22 DIAGNOSIS — Z30.41 ORAL CONTRACEPTIVE PILL SURVEILLANCE: Primary | ICD-10-CM

## 2018-07-02 ENCOUNTER — ANNUAL EXAM (OUTPATIENT)
Dept: OBGYN CLINIC | Facility: CLINIC | Age: 38
End: 2018-07-02
Payer: COMMERCIAL

## 2018-07-02 VITALS
WEIGHT: 170 LBS | BODY MASS INDEX: 28.32 KG/M2 | HEIGHT: 65 IN | SYSTOLIC BLOOD PRESSURE: 124 MMHG | DIASTOLIC BLOOD PRESSURE: 68 MMHG

## 2018-07-02 DIAGNOSIS — Z30.41 ORAL CONTRACEPTIVE PILL SURVEILLANCE: ICD-10-CM

## 2018-07-02 DIAGNOSIS — E66.3 OVERWEIGHT (BMI 25.0-29.9): ICD-10-CM

## 2018-07-02 DIAGNOSIS — N92.1 METRORRHAGIA: ICD-10-CM

## 2018-07-02 DIAGNOSIS — Z01.419 ENCOUNTER FOR ANNUAL ROUTINE GYNECOLOGICAL EXAMINATION: Primary | ICD-10-CM

## 2018-07-02 DIAGNOSIS — E58 DIETARY CALCIUM DEFICIENCY: ICD-10-CM

## 2018-07-02 DIAGNOSIS — Z12.4 PAP SMEAR FOR CERVICAL CANCER SCREENING: ICD-10-CM

## 2018-07-02 DIAGNOSIS — E28.2 PCOS (POLYCYSTIC OVARIAN SYNDROME): ICD-10-CM

## 2018-07-02 PROBLEM — B37.3 YEAST VAGINITIS: Status: RESOLVED | Noted: 2018-05-29 | Resolved: 2018-07-02

## 2018-07-02 PROBLEM — B96.89 BACTERIAL VAGINITIS: Status: RESOLVED | Noted: 2018-05-24 | Resolved: 2018-07-02

## 2018-07-02 PROBLEM — N89.8 VAGINAL DISCHARGE: Status: RESOLVED | Noted: 2018-05-24 | Resolved: 2018-07-02

## 2018-07-02 PROBLEM — S30.854A: Status: RESOLVED | Noted: 2018-05-24 | Resolved: 2018-07-02

## 2018-07-02 PROBLEM — N89.8 VAGINAL ODOR: Status: RESOLVED | Noted: 2018-05-24 | Resolved: 2018-07-02

## 2018-07-02 PROBLEM — N76.0 BACTERIAL VAGINITIS: Status: RESOLVED | Noted: 2018-05-24 | Resolved: 2018-07-02

## 2018-07-02 PROCEDURE — S0612 ANNUAL GYNECOLOGICAL EXAMINA: HCPCS | Performed by: OBSTETRICS & GYNECOLOGY

## 2018-07-02 RX ORDER — ESTRADIOL 2 MG/1
2 TABLET ORAL DAILY
Qty: 21 TABLET | Refills: 0 | Status: SHIPPED | OUTPATIENT
Start: 2018-07-02 | End: 2019-05-15

## 2018-07-02 RX ORDER — NORGESTIMATE AND ETHINYL ESTRADIOL 0.25-0.035
1 KIT ORAL DAILY
Qty: 84 TABLET | Refills: 4 | Status: SHIPPED | OUTPATIENT
Start: 2018-07-02 | End: 2019-09-05 | Stop reason: SDUPTHER

## 2018-07-02 RX ORDER — DIPHENOXYLATE HYDROCHLORIDE AND ATROPINE SULFATE 2.5; .025 MG/1; MG/1
1 TABLET ORAL DAILY
COMMUNITY

## 2018-07-02 RX ORDER — BUPROPION HYDROCHLORIDE 150 MG/1
TABLET ORAL
COMMUNITY
Start: 2018-06-11

## 2018-07-02 RX ORDER — TOPIRAMATE 25 MG/1
TABLET ORAL
Refills: 0 | COMMUNITY
Start: 2018-05-30 | End: 2018-08-07

## 2018-07-02 NOTE — PROGRESS NOTES
Pt is a 45 y o  [id number] with Patient's last menstrual period was 2018 (exact date)  using [facility] for [facility] presents for preventive care  She notes the same partner since her last STI evaluation  In her lifetime she has been involved with >5 partners   Safe sexual practices (monogomy, condoms) are followed consistently  · She does  feel safe in the relationship  She does feel safe in her home  · Her calcium intake encompasses multivitamin , milk (cow, goat, almond, cashew, soy, etc), cheese and greens (neri, turnip, kale, etc) for a total of 2-3 servings daily on average  She does take additional Vitamin D (MVI or supplement)  · She exercises 3 times per week  · Her menses occur every irregular  Days, last 5-6 days and require regular pad every 7-8 hours  Menstrual History:  OB History      Para Term  AB Living    4 2 2   2      SAB TAB Ectopic Multiple Live Births    2                Obstetric Comments    Menarche: 15         Menarche age: 15  Patient's last menstrual period was 2018 (exact date)  ·      · She has never recieved an HPV vaccine  · tobacco use : does not use tobacco              · Last pap; 2015--wnl, repeat today  · Pt reports bleeding between periods even though she is taking her OCPS regularly, usually during week 3 of her OCPS--will try estradiol x 3 months       Past Medical History:   Diagnosis Date    Chickenpox     Migraine without aura     Oral herpes     PCOS (polycystic ovarian syndrome)        Past Surgical History:   Procedure Laterality Date    CHOLECYSTECTOMY      DILATION AND CURETTAGE, DIAGNOSTIC / THERAPEUTIC      miscarriage x 2    REDUCTION MAMMAPLASTY      WISDOM TOOTH EXTRACTION         OB History    Para Term  AB Living   4 2 2   2     SAB TAB Ectopic Multiple Live Births   2              # Outcome Date GA Lbr Izaiah/2nd Weight Sex Delivery Anes PTL Lv   4 SAB            3 SAB            2 Term            1 Term               Obstetric Comments   Menarche: 15       Gyn HX:  pcos and irregular menses       Current Outpatient Prescriptions:     buPROPion (WELLBUTRIN XL) 150 mg 24 hr tablet, Take 2 tabs daily, Disp: , Rfl:     Cholecalciferol (VITAMIN D-3) 1000 units CAPS, Take 1,000 Units by mouth, Disp: , Rfl:     diclofenac potassium (CATAFLAM) 50 mg tablet, 50mg 1-2 times a day as needed for pain , Disp: , Rfl:     multivitamin (THERAGRAN) TABS, Take 1 tablet by mouth daily, Disp: , Rfl:     norgestimate-ethinyl estradiol (SPRINTEC 28) 0 25-35 MG-MCG per tablet, Take 1 tablet by mouth daily, Disp: 84 tablet, Rfl: 4    SUMAtriptan (IMITREX) 100 mg tablet, TAKE 1 TABLET AS NEEDED FOR MIGRAINE, MAY REPEAT IN 2 HOURS IF NEEDED  MAX OF 200MG/24 HRS, Disp: , Rfl:     topiramate (TOPAMAX) 25 mg tablet, TAKE 2 TABLETS (50 MG TOTAL) BY MOUTH 2 (TWO) TIMES A DAY , Disp: , Rfl: 0    estradiol (ESTRACE) 2 MG tablet, Take 1 tablet (2 mg total) by mouth daily For 21 days and then 7 days off  Repeat x 3 cycles total, Disp: 21 tablet, Rfl: 0    Allergies   Allergen Reactions    Amoxicillin-Pot Clavulanate Hives     Other reaction(s): Rash , can take amoxil without problems      Metformin      Stomach Side effects       Social History     Social History    Marital status:      Spouse name: N/A    Number of children: 2    Years of education: , vocational     Occupational History          Social History Main Topics    Smoking status: Never Smoker    Smokeless tobacco: Never Used    Alcohol use 1 2 oz/week     2 Glasses of wine per week    Drug use: No    Sexual activity: Yes     Partners: Male     Birth control/ protection: Pill      Comment: lifetime partners: 7     Other Topics Concern    None     Social History Narrative    Holiness: no preference    Accepts blood products       Family History   Problem Relation Age of Onset    Hypertension Mother     Hypertension Father     Anxiety disorder Brother     Thyroid disease Maternal Grandmother     Hypertension Maternal Grandmother     Hyperlipidemia Maternal Grandmother     Diabetes Maternal Grandmother     Hypertension Maternal Grandfather     Hyperlipidemia Maternal Grandfather     Heart disease Maternal Grandfather        Blood pressure 124/68, height 5' 4 57" (1 64 m), weight 77 1 kg (170 lb), last menstrual period 06/30/2018, not currently breastfeeding  and Body mass index is 28 67 kg/m²  Physical Exam   Constitutional: She is oriented to person, place, and time  She appears well-developed and well-nourished  HENT:   Head: Normocephalic and atraumatic  Eyes: Conjunctivae and EOM are normal    Neck: Normal range of motion  Neck supple  No tracheal deviation present  No thyromegaly present  Cardiovascular: Normal rate, regular rhythm and normal heart sounds  Pulmonary/Chest: Effort normal and breath sounds normal  No stridor  No respiratory distress  She has no wheezes  She has no rales  Abdominal: Soft  Bowel sounds are normal  She exhibits no distension and no mass  There is no tenderness  There is no rebound and no guarding  Musculoskeletal: Normal range of motion  She exhibits no edema or tenderness  Lymphadenopathy:     She has no cervical adenopathy  Neurological: She is alert and oriented to person, place, and time  Skin: Skin is warm  No rash noted  No erythema  Psychiatric: She has a normal mood and affect  Her behavior is normal  Judgment and thought content normal    Vitals reviewed  Breasts: breasts appear normal, no suspicious masses, no skin or nipple changes or axillary nodes, symmetric fibrous changes in both upper outer quadrants, surgical scars noted reduction mammoplasty       vulva: normal external genitalia for age and no lesions, masses, epithelial changes, or exudate  vagina: color pink, rugae  well formed rugae and bleeding  with a small amount of bleeding  cervix: parous, no lesions and pap obtained  uterus: NSSC, AF, NT, mobile  adnexa: no masses or tenderness      A/P:  Pt is a 45 y o  [id number] with      Diagnoses and all orders for this visit:    Encounter for annual routine gynecological examination    Pap smear for cervical cancer screening  -     Thinprep Tis Pap and HPV mRNA E6/E7 Reflex HPV 16,18/45    Dietary calcium deficiency    Metrorrhagia  -     estradiol (ESTRACE) 2 MG tablet; Take 1 tablet (2 mg total) by mouth daily For 21 days and then 7 days off  Repeat x 3 cycles total    Overweight (BMI 25 0-29  9)    PCOS (polycystic ovarian syndrome)  -     norgestimate-ethinyl estradiol (SPRINTEC 28) 0 25-35 MG-MCG per tablet; Take 1 tablet by mouth daily    Oral contraceptive pill surveillance  -     norgestimate-ethinyl estradiol (SPRINTEC 28) 0 25-35 MG-MCG per tablet; Take 1 tablet by mouth daily      Patient advised recommendation of daily dietary calcium of  1000 mg calcium  Patient advised recommendation of exercise 5 times per week for 30 minutes  Patient advised recommendation of BMI to be between 19-25

## 2018-07-05 LAB
CLINICAL INFO: NORMAL
CYTO CVX: NORMAL
CYTOLOGY CMNT CVX/VAG CYTO-IMP: NORMAL
DATE PREVIOUS BX: NORMAL
HPV E6+E7 MRNA CVX QL NAA+PROBE: NOT DETECTED
LMP START DATE: NORMAL
MICROORGANISM CVX/VAG CYTO: NORMAL
SL AMB PREV. PAP:: NORMAL
SPECIMEN SOURCE CVX/VAG CYTO: NORMAL

## 2018-07-06 ENCOUNTER — TELEPHONE (OUTPATIENT)
Dept: OBGYN CLINIC | Facility: CLINIC | Age: 38
End: 2018-07-06

## 2018-07-06 NOTE — TELEPHONE ENCOUNTER
Pt called back--reviewed with her that her pap smear is normal, but indicates that she has a trichomonas infection (which was negative upon testing in 5/24/18)  Pt offered treatment, but she would like confirmation testing  Scheduled for next week  Will follow up results

## 2018-07-10 ENCOUNTER — OFFICE VISIT (OUTPATIENT)
Dept: OBGYN CLINIC | Facility: CLINIC | Age: 38
End: 2018-07-10
Payer: COMMERCIAL

## 2018-07-10 VITALS
BODY MASS INDEX: 28.84 KG/M2 | WEIGHT: 171 LBS | DIASTOLIC BLOOD PRESSURE: 76 MMHG | OXYGEN SATURATION: 98 % | SYSTOLIC BLOOD PRESSURE: 110 MMHG | HEART RATE: 74 BPM

## 2018-07-10 DIAGNOSIS — A59.9 TRICHIMONIASIS: Primary | ICD-10-CM

## 2018-07-10 DIAGNOSIS — Z20.2 POSSIBLE EXPOSURE TO STD: ICD-10-CM

## 2018-07-10 LAB — SL AMB POCT WET MOUNT: ABNORMAL

## 2018-07-10 PROCEDURE — 99213 OFFICE O/P EST LOW 20 MIN: CPT | Performed by: NURSE PRACTITIONER

## 2018-07-10 PROCEDURE — 87210 SMEAR WET MOUNT SALINE/INK: CPT | Performed by: NURSE PRACTITIONER

## 2018-07-10 RX ORDER — METRONIDAZOLE 500 MG/1
TABLET ORAL
Qty: 4 TABLET | Refills: 0 | Status: SHIPPED | OUTPATIENT
Start: 2018-07-10 | End: 2018-07-17

## 2018-07-10 NOTE — PROGRESS NOTES
Assessment/Plan:    1  Trichimoniasis  Confirmed on wet mount  Discussed nature of trich with patient in detail  Since 5/2018 vag culture and wet mount were negative for trich, explained that she very likely got it from her present partner  Advised to inform him and have him be treated ASAP  Abstain until after treated, use condoms all the time  Rx sent for patient to [facility]  - metroNIDAZOLE (FLAGYL) 500 mg tablet; Take two gram x one  Dispense: 4 tablet; Refill: 0    2  Possible exposure to STD  As above  Since positive for trich, GC & chlamydia obtained  I have spent 15 minutes with Patient  today in which greater than 50% of this time was spent in counseling/coordination of care regarding Diagnostic results, Risks and benefits of tx options, Intructions for management, Importance of tx compliance and Risk factor reductions  Subjective:      Patient ID: Oswald Courtney is a 45 y o  female  HPI  PROBLEM VISIT  CC: trichomonas on pap     Patient presents for confirmation of trichomonas which was noted on her recent pap smear  She does note some vaginal discharge with some odor  Denies abnormal vaginal bleeding, pelvic pain or urinary symptoms  GC/chlamydia, 10/2017 negative  Vaginal culture, 5/2018 + for BV, negative for trichomonas    New partner of 3+ months  Not using condoms  The following portions of the patient's history were reviewed and updated as appropriate: allergies, current medications, past family history, past medical history, past social history, past surgical history and problem list     Review of Systems   Constitutional: Negative for chills and fever  Genitourinary: Positive for vaginal discharge (some odor, some itching)  Negative for dyspareunia, dysuria, frequency, genital sores, hematuria, menstrual problem, pelvic pain, urgency, vaginal bleeding and vaginal pain          Recent antibiotic treatment         Objective:      /76 (BP Location: Left arm, Patient Position: Sitting, Cuff Size: Adult)   Pulse 74   Wt 77 6 kg (171 lb)   LMP 06/30/2018 (Exact Date)   SpO2 98%   BMI 28 84 kg/m²     WET MOUNT: + trichomonads, negative hyphae, negative clue cells  + amine  Physical Exam   Constitutional: She appears well-developed  Genitourinary: There is no rash, tenderness, lesion or injury on the right labia  There is no rash, tenderness, lesion or injury on the left labia  Uterus is not enlarged and not tender  Cervix exhibits no motion tenderness, no discharge and no friability  Right adnexum displays no mass and no tenderness  Left adnexum displays no mass and no tenderness  There is erythema in the vagina  No tenderness or bleeding in the vagina  No foreign body in the vagina  No signs of injury around the vagina  Vaginal discharge found  Lymphadenopathy:        Right: No inguinal adenopathy present  Left: No inguinal adenopathy present

## 2018-07-12 LAB
A VAGINAE DNA VAG NAA+PROBE-LOG#: NOT DETECTED LOG (CELLS/ML)
C GLABRATA DNA VAG QL NAA+PROBE: NOT DETECTED
C TRACH RRNA SPEC QL NAA+PROBE: NOT DETECTED
CANDIDA DNA VAG QL NAA+PROBE: NOT DETECTED
G VAGINALIS DNA VAG NAA+PROBE-LOG#: 6 LOG (CELLS/ML)
LACTOBACILLUS DNA VAG NAA+PROBE-LOG#: DETECTED LOG (CELLS/ML)
MEGASPHAERA SP DNA VAG NAA+PROBE-LOG#: NOT DETECTED LOG (CELLS/ML)
N GONORRHOEA RRNA SPEC QL NAA+PROBE: NOT DETECTED
SL AMB BV CATEGORY:: ABNORMAL
SL AMB C. PARAPSILOSIS, DNA: NOT DETECTED
SL AMB C. TROPICALIS, DNA: NOT DETECTED
T VAGINALIS RRNA SPEC QL NAA+PROBE: DETECTED

## 2018-08-07 ENCOUNTER — OFFICE VISIT (OUTPATIENT)
Dept: OBGYN CLINIC | Facility: CLINIC | Age: 38
End: 2018-08-07
Payer: COMMERCIAL

## 2018-08-07 VITALS — DIASTOLIC BLOOD PRESSURE: 80 MMHG | BODY MASS INDEX: 29.51 KG/M2 | SYSTOLIC BLOOD PRESSURE: 110 MMHG | WEIGHT: 175 LBS

## 2018-08-07 DIAGNOSIS — N76.0 BACTERIAL VAGINOSIS: Primary | ICD-10-CM

## 2018-08-07 DIAGNOSIS — B96.89 BACTERIAL VAGINOSIS: Primary | ICD-10-CM

## 2018-08-07 DIAGNOSIS — N76.0 ACUTE VAGINITIS: ICD-10-CM

## 2018-08-07 PROCEDURE — 99213 OFFICE O/P EST LOW 20 MIN: CPT | Performed by: NURSE PRACTITIONER

## 2018-08-07 RX ORDER — SUMATRIPTAN 100 MG/1
TABLET, FILM COATED ORAL
COMMUNITY
Start: 2018-07-25

## 2018-08-07 RX ORDER — TOPIRAMATE 25 MG/1
50 TABLET ORAL
COMMUNITY
Start: 2018-07-25

## 2018-08-07 RX ORDER — CLINDAMYCIN PHOSPHATE 20 MG/G
1 CREAM VAGINAL
Qty: 40 G | Refills: 0 | Status: SHIPPED | OUTPATIENT
Start: 2018-08-07 | End: 2018-08-14

## 2018-08-07 NOTE — PROGRESS NOTES
Assessment/Plan:    1  Bacterial vaginosis  Clue cells on wet mount with positive whiff  Patient reports that she cannot tolerate metronidazole as it causes migraine  Will treat with cleocin 2% vaginal     - clindamycin (CLEOCIN) 2 % vaginal cream; Insert 1 applicator into the vagina daily at bedtime  Dispense: 40 g; Refill: 0    Subjective:      Patient ID: Lucy Salgado is a 45 y o  female  HPI  PROBLEM VISIT  CC:  Genital symptoms    External genital irritation  No increased vaginal discharge  No foul odor  No abn bleeding or pelvic pain  No urinary symptoms, but burns when hits outside    Treated one month ago for trichomonas  States that her partner was also treated  The following portions of the patient's history were reviewed and updated as appropriate: allergies, current medications, past family history, past medical history, past social history, past surgical history and problem list     Review of Systems   Constitutional: Negative for chills and fever  Genitourinary: Positive for vaginal discharge  Negative for dysuria, frequency, genital sores, menstrual problem, pelvic pain, urgency and vaginal bleeding  External irritation       Objective:    /80 (BP Location: Left arm, Patient Position: Sitting)   Wt 79 4 kg (175 lb)   LMP 07/28/2018 (Exact Date)   Breastfeeding? No   BMI 29 51 kg/m²     Wet mount:  + clue cells, + wbcs, negative trich/ negative hyphae; + whiff     Physical Exam   Constitutional: She appears well-developed  Genitourinary: There is no rash, tenderness, lesion or injury on the right labia  There is no rash, tenderness, lesion or injury on the left labia  Uterus is not enlarged and not tender  Cervix exhibits no motion tenderness, no discharge and no friability  Right adnexum displays no mass and no tenderness  Left adnexum displays no mass and no tenderness  No erythema, tenderness or bleeding in the vagina  No foreign body in the vagina   No signs of injury around the vagina  Vaginal discharge found  Lymphadenopathy:        Right: No inguinal adenopathy present  Left: No inguinal adenopathy present

## 2018-08-10 ENCOUNTER — TELEPHONE (OUTPATIENT)
Dept: OBGYN CLINIC | Facility: CLINIC | Age: 38
End: 2018-08-10

## 2018-08-10 NOTE — TELEPHONE ENCOUNTER
Pt states she cannot take Flagyl because she gets very sick with severe migraines as a side effect  Tindazol 500 mg was called into the pharmacy and pt was instructed how to take medication

## 2018-08-14 ENCOUNTER — TELEPHONE (OUTPATIENT)
Dept: OBGYN CLINIC | Facility: CLINIC | Age: 38
End: 2018-08-14

## 2018-08-14 DIAGNOSIS — A59.01 VAGINAL TRICHOMONIASIS: Primary | ICD-10-CM

## 2018-08-14 RX ORDER — METRONIDAZOLE 500 MG/1
TABLET ORAL
Qty: 4 TABLET | Refills: 0 | Status: SHIPPED | OUTPATIENT
Start: 2018-08-14 | End: 2018-08-15 | Stop reason: CLARIF

## 2018-08-14 NOTE — TELEPHONE ENCOUNTER
Rx called in to [facility] pharmacy, [facility] , [place] for vaginal metrogel, #1, one appl vaginally x 5 nights, no refill

## 2018-08-15 DIAGNOSIS — N76.0 ACUTE VAGINITIS: Primary | ICD-10-CM

## 2018-08-15 RX ORDER — FLUCONAZOLE 150 MG/1
150 TABLET ORAL ONCE
Qty: 2 TABLET | Refills: 0 | Status: SHIPPED | OUTPATIENT
Start: 2018-08-15 | End: 2018-08-15

## 2019-05-15 ENCOUNTER — OFFICE VISIT (OUTPATIENT)
Dept: OBGYN CLINIC | Facility: CLINIC | Age: 39
End: 2019-05-15
Payer: COMMERCIAL

## 2019-05-15 VITALS
OXYGEN SATURATION: 96 % | HEART RATE: 82 BPM | BODY MASS INDEX: 29.31 KG/M2 | WEIGHT: 173.8 LBS | DIASTOLIC BLOOD PRESSURE: 70 MMHG | SYSTOLIC BLOOD PRESSURE: 120 MMHG

## 2019-05-15 DIAGNOSIS — N89.8 VAGINAL IRRITATION: Primary | ICD-10-CM

## 2019-05-15 DIAGNOSIS — N76.0 BACTERIAL VAGINITIS: ICD-10-CM

## 2019-05-15 DIAGNOSIS — B96.89 BACTERIAL VAGINITIS: ICD-10-CM

## 2019-05-15 DIAGNOSIS — B37.3 YEAST VAGINITIS: ICD-10-CM

## 2019-05-15 DIAGNOSIS — N89.8 VAGINAL DISCHARGE: ICD-10-CM

## 2019-05-15 DIAGNOSIS — R39.15 URGENCY OF URINATION: ICD-10-CM

## 2019-05-15 DIAGNOSIS — R30.0 DYSURIA: ICD-10-CM

## 2019-05-15 DIAGNOSIS — R35.0 FREQUENCY OF URINATION: ICD-10-CM

## 2019-05-15 LAB
SL AMB  POCT GLUCOSE, UA: ABNORMAL
SL AMB LEUKOCYTE ESTERASE,UA: ABNORMAL
SL AMB POCT BILIRUBIN,UA: ABNORMAL
SL AMB POCT BLOOD,UA: ABNORMAL
SL AMB POCT CLARITY,UA: CLEAR
SL AMB POCT COLOR,UA: YELLOW
SL AMB POCT KETONES,UA: ABNORMAL
SL AMB POCT NITRITE,UA: ABNORMAL
SL AMB POCT PH,UA: 5
SL AMB POCT SPECIFIC GRAVITY,UA: 1.03
SL AMB POCT URINE PROTEIN: ABNORMAL
SL AMB POCT UROBILINOGEN: 0.2
SL AMB POCT WET MOUNT: ABNORMAL

## 2019-05-15 PROCEDURE — 99214 OFFICE O/P EST MOD 30 MIN: CPT | Performed by: OBSTETRICS & GYNECOLOGY

## 2019-05-15 PROCEDURE — 87210 SMEAR WET MOUNT SALINE/INK: CPT | Performed by: OBSTETRICS & GYNECOLOGY

## 2019-05-15 PROCEDURE — 81002 URINALYSIS NONAUTO W/O SCOPE: CPT | Performed by: OBSTETRICS & GYNECOLOGY

## 2019-05-15 RX ORDER — FLUCONAZOLE 150 MG/1
150 TABLET ORAL ONCE
Qty: 1 TABLET | Refills: 0 | Status: SHIPPED | OUTPATIENT
Start: 2019-05-15 | End: 2019-05-15

## 2019-05-15 RX ORDER — METRONIDAZOLE 7.5 MG/G
1 GEL VAGINAL
Qty: 70 G | Refills: 0 | Status: SHIPPED | OUTPATIENT
Start: 2019-05-15 | End: 2019-05-20

## 2019-05-17 ENCOUNTER — TELEPHONE (OUTPATIENT)
Dept: OBGYN CLINIC | Facility: CLINIC | Age: 39
End: 2019-05-17

## 2019-05-17 DIAGNOSIS — N76.0 BACTERIAL VAGINITIS: Primary | ICD-10-CM

## 2019-05-17 DIAGNOSIS — B96.89 BACTERIAL VAGINITIS: Primary | ICD-10-CM

## 2019-05-17 RX ORDER — CLINDAMYCIN PHOSPHATE 20 MG/G
1 CREAM VAGINAL
Qty: 40 G | Refills: 0 | Status: SHIPPED | OUTPATIENT
Start: 2019-05-17 | End: 2019-10-08

## 2019-09-05 ENCOUNTER — TELEPHONE (OUTPATIENT)
Dept: OBGYN CLINIC | Facility: CLINIC | Age: 39
End: 2019-09-05

## 2019-09-05 DIAGNOSIS — Z30.41 ORAL CONTRACEPTIVE PILL SURVEILLANCE: ICD-10-CM

## 2019-09-05 DIAGNOSIS — E28.2 PCOS (POLYCYSTIC OVARIAN SYNDROME): ICD-10-CM

## 2019-09-05 RX ORDER — NORGESTIMATE AND ETHINYL ESTRADIOL 0.25-0.035
1 KIT ORAL DAILY
Qty: 28 TABLET | Refills: 0 | Status: SHIPPED | OUTPATIENT
Start: 2019-09-05 | End: 2019-10-08 | Stop reason: SDUPTHER

## 2019-09-05 NOTE — TELEPHONE ENCOUNTER
Pt called to schedule her annual exam with Dr Meghan Almanza in October and needs a refill of her birth control sent to her pharmacy on file until her appt

## 2019-09-10 DIAGNOSIS — Z30.41 ORAL CONTRACEPTIVE PILL SURVEILLANCE: ICD-10-CM

## 2019-09-10 DIAGNOSIS — E28.2 PCOS (POLYCYSTIC OVARIAN SYNDROME): ICD-10-CM

## 2019-09-11 NOTE — TELEPHONE ENCOUNTER
1 mo supply given on 9/5/2019  Pt was due for annual in July   Refills will be given at time of annual examination

## 2019-10-02 DIAGNOSIS — Z30.41 ORAL CONTRACEPTIVE PILL SURVEILLANCE: ICD-10-CM

## 2019-10-02 DIAGNOSIS — E28.2 PCOS (POLYCYSTIC OVARIAN SYNDROME): ICD-10-CM

## 2019-10-03 NOTE — TELEPHONE ENCOUNTER
Please check with pt if she has enough pills to get to her appointment on 10/8  Pt was due for annual exam in July and was given refills in September to get her to her annual  If she has enough, 1 year supply will be given at her annual examination

## 2019-10-08 ENCOUNTER — ANNUAL EXAM (OUTPATIENT)
Dept: OBGYN CLINIC | Facility: CLINIC | Age: 39
End: 2019-10-08
Payer: COMMERCIAL

## 2019-10-08 VITALS
WEIGHT: 176.6 LBS | BODY MASS INDEX: 29.42 KG/M2 | SYSTOLIC BLOOD PRESSURE: 126 MMHG | HEIGHT: 65 IN | DIASTOLIC BLOOD PRESSURE: 82 MMHG | OXYGEN SATURATION: 99 % | HEART RATE: 86 BPM

## 2019-10-08 DIAGNOSIS — E28.2 PCOS (POLYCYSTIC OVARIAN SYNDROME): ICD-10-CM

## 2019-10-08 DIAGNOSIS — E58 DIETARY CALCIUM DEFICIENCY: ICD-10-CM

## 2019-10-08 DIAGNOSIS — E66.3 OVERWEIGHT (BMI 25.0-29.9): ICD-10-CM

## 2019-10-08 DIAGNOSIS — Z30.41 ORAL CONTRACEPTIVE PILL SURVEILLANCE: ICD-10-CM

## 2019-10-08 DIAGNOSIS — Z01.419 ENCOUNTER FOR ANNUAL ROUTINE GYNECOLOGICAL EXAMINATION: Primary | ICD-10-CM

## 2019-10-08 PROCEDURE — S0612 ANNUAL GYNECOLOGICAL EXAMINA: HCPCS | Performed by: OBSTETRICS & GYNECOLOGY

## 2019-10-08 RX ORDER — NORGESTIMATE AND ETHINYL ESTRADIOL 0.25-0.035
1 KIT ORAL DAILY
Qty: 84 TABLET | Refills: 4 | Status: SHIPPED | OUTPATIENT
Start: 2019-10-08 | End: 2020-11-19 | Stop reason: SDUPTHER

## 2019-10-08 RX ORDER — NORTRIPTYLINE HYDROCHLORIDE 10 MG/1
10 CAPSULE ORAL EVERY EVENING
Refills: 3 | COMMUNITY
Start: 2019-09-03

## 2019-10-08 NOTE — PROGRESS NOTES
Pt is a 44 y o  [id number] with Patient's last menstrual period was 2019 (approximate)  using [facility] for [facility] presents for preventive care  She notes the same partner since her last STI evaluation  In her lifetime she has been involved with >5 partners   Safe sexual practices (monogomy, condoms) are not followed consistently  Declines STI screening despite acquiring an STI from this partner <1 year ago         · She does  feel safe in the relationship  She does feel safe in her home  · Her calcium intake encompasses multivitamin, milk (cow, goat, almond, cashew, soy, etc), cheese and yogurt for a total of 3-4 servings daily on average  She does take additional Vitamin D (MVI or supplement)  · She exercises 2-3 times per week  · Her menses occur every 28 Days, last 4-5 days and require regular pad every 6-7 hours  Menstrual History:  OB History        4    Para   2    Term   2            AB   2    Living   2       SAB   2    TAB        Ectopic        Multiple        Live Births               Obstetric Comments   Menarche: 13    Menses: 28/5/regular pad every 6 hours            Menarche age: 15  Patient's last menstrual period was 2019 (approximate)  Period Cycle (Days): 28  Period Duration (Days): 5-7  Period Pattern: Regular  Menstrual Flow: Moderate, Light  Menstrual Control: Tampon, Thin pad  ·      · She has never recieved an HPV vaccine    · tobacco use : does not use tobacco              · Colonoscopy: not indicated  · Mammogram: not indicated  · Pap: 2018-wnl, HRHPV neg; repeat     Past Medical History:   Diagnosis Date    Chickenpox     Migraine without aura     Oral herpes     PCOS (polycystic ovarian syndrome)     Urogenital trichomoniasis 2018    treated       Past Surgical History:   Procedure Laterality Date    CHOLECYSTECTOMY      DILATION AND CURETTAGE, DIAGNOSTIC / THERAPEUTIC      miscarriage x 2    REDUCTION MAMMAPLASTY      WISDOM TOOTH EXTRACTION OB History    Para Term  AB Living   4 2 2   2 2   SAB TAB Ectopic Multiple Live Births   2              # Outcome Date GA Lbr Izaiah/2nd Weight Sex Delivery Anes PTL Lv   4 SAB            3 SAB            2 Term            1 Term               Obstetric Comments   Menarche: 13      Menses: 28/5/regular pad every 6 hours            Current Outpatient Medications:     buPROPion (WELLBUTRIN XL) 150 mg 24 hr tablet, Take 2 tabs daily, Disp: , Rfl:     Cholecalciferol (VITAMIN D-3) 1000 units CAPS, Take 1,000 Units by mouth, Disp: , Rfl:     diclofenac potassium (CATAFLAM) 50 mg tablet, 50mg 1-2 times a day as needed for pain , Disp: , Rfl:     multivitamin (THERAGRAN) TABS, Take 1 tablet by mouth daily, Disp: , Rfl:     norgestimate-ethinyl estradiol (SPRINTEC 28) 0 25-35 MG-MCG per tablet, Take 1 tablet by mouth daily, Disp: 84 tablet, Rfl: 4    nortriptyline (PAMELOR) 10 mg capsule, Take 10 mg by mouth every evening, Disp: , Rfl: 3    SUMAtriptan (IMITREX) 100 mg tablet, Take 1 tab as needed for migraine  May repeat in 2 hours if unresolved  Do not exceed 200 mg in 24 hours  , Disp: , Rfl:     topiramate (TOPAMAX) 25 mg tablet, Take 50 mg by mouth, Disp: , Rfl:     Allergies   Allergen Reactions    Amoxicillin-Pot Clavulanate Hives     Other reaction(s): Rash , can take amoxil without problems   Metformin      Stomach Side effects       Social History     Socioeconomic History    Marital status:      Spouse name: None    Number of children: 2    Years of education: hs, vocational    Highest education level: None   Occupational History    Occupation:    Social Needs    Financial resource strain: None    Food insecurity:     Worry: None     Inability: None    Transportation needs:     Medical: None     Non-medical: None   Tobacco Use    Smoking status: Never Smoker    Smokeless tobacco: Never Used   Substance and Sexual Activity    Alcohol use:  Yes Alcohol/week: 5 0 standard drinks     Types: 5 Glasses of wine per week     Frequency: 2-3 times a week     Drinks per session: 1 or 2    Drug use: No    Sexual activity: Yes     Partners: Male     Birth control/protection: Pill     Comment: lifetime partners: 7; current partner: 1-2 years   Lifestyle    Physical activity:     Days per week: None     Minutes per session: None    Stress: None   Relationships    Social connections:     Talks on phone: None     Gets together: None     Attends [employer]ical service: None     Active member of club or organization: None     Attends meetings of clubs or organizations: None     Relationship status: None    Intimate partner violence:     Fear of current or ex partner: None     Emotionally abused: None     Physically abused: None     Forced sexual activity: None   Other Topics Concern    None   Social History Narrative    Evangelical: no preference    Accepts blood products            Exercise: 2-3x/week    Calcium: 1 c milk 3x/week, 2 cheese daily, multivitamin daily       Family History   Problem Relation Age of Onset    Hypertension Mother     Hypertension Father     Anxiety disorder Brother     Thyroid disease Maternal Grandmother     Hypertension Maternal Grandmother     Hyperlipidemia Maternal Grandmother     Diabetes Maternal Grandmother     Hypertension Maternal Grandfather     Hyperlipidemia Maternal Grandfather     Heart disease Maternal Grandfather     Breast cancer Neg Hx     Ovarian cancer Neg Hx     Colon cancer Neg Hx        Blood pressure 126/82, pulse 86, height 5' 5" (1 651 m), weight 80 1 kg (176 lb 9 6 oz), last menstrual period 09/18/2019, SpO2 99 %, not currently breastfeeding  and Body mass index is 29 39 kg/m²  Physical Exam   Constitutional: She is oriented to person, place, and time  She appears well-developed and well-nourished  HENT:   Head: Normocephalic and atraumatic     Eyes: Conjunctivae and EOM are normal    Neck: Normal range of motion  Neck supple  No tracheal deviation present  No thyromegaly present  Cardiovascular: Normal rate, regular rhythm and normal heart sounds  Pulmonary/Chest: Effort normal and breath sounds normal  No stridor  No respiratory distress  She has no wheezes  She has no rales  Abdominal: Soft  Bowel sounds are normal  She exhibits no distension and no mass  There is no tenderness  There is no rebound and no guarding  Musculoskeletal: Normal range of motion  She exhibits no edema or tenderness  Lymphadenopathy:     She has no cervical adenopathy  Neurological: She is alert and oriented to person, place, and time  Skin: Skin is warm  No rash noted  No erythema  Psychiatric: She has a normal mood and affect  Her behavior is normal  Judgment and thought content normal      Breasts: breasts appear normal, no suspicious masses, no skin or nipple changes or axillary nodes, symmetric fibrous changes in both upper outer quadrants  vulva: normal external genitalia for age, no lesions, masses, epithelial changes, or exudate and clitoral ring in place  vagina: color pink, rugae  well formed rugae and discharge  white and thick, creamy  cervix: parous and no lesions   uterus: NSSC, AF, NT, mobile  adnexa: no masses or tenderness      A/P:  Pt is a 44 y o  [id number] with      Diagnoses and all orders for this visit:    Encounter for annual routine gynecological examination    Oral contraceptive pill surveillance  -     norgestimate-ethinyl estradiol ([facility] 28) 0 25-35 MG-MCG per tablet; Take 1 tablet by mouth daily    PCOS (polycystic ovarian syndrome)  -     norgestimate-ethinyl estradiol (SPRINTEC 28) 0 25-35 MG-MCG per tablet; Take 1 tablet by mouth daily    Dietary calcium deficiency    Overweight (BMI 25 0-29  9)    Patient advised recommendation of daily dietary calcium of   1000 mg calcium  Patient advised recommendation of exercise 5 times per week for 30 minutes    Patient advised recommendation of BMI to be between 19-25

## 2020-05-12 ENCOUNTER — OCCMED (OUTPATIENT)
Dept: URGENT CARE | Age: 40
End: 2020-05-12
Payer: OTHER MISCELLANEOUS

## 2020-05-12 ENCOUNTER — APPOINTMENT (OUTPATIENT)
Dept: RADIOLOGY | Age: 40
End: 2020-05-12
Attending: FAMILY MEDICINE
Payer: OTHER MISCELLANEOUS

## 2020-05-12 DIAGNOSIS — S69.92XA INJURY OF LEFT WRIST, INITIAL ENCOUNTER: Primary | ICD-10-CM

## 2020-05-12 DIAGNOSIS — S69.92XA INJURY OF LEFT WRIST, INITIAL ENCOUNTER: ICD-10-CM

## 2020-05-12 PROCEDURE — 99283 EMERGENCY DEPT VISIT LOW MDM: CPT | Performed by: FAMILY MEDICINE

## 2020-05-12 PROCEDURE — 73110 X-RAY EXAM OF WRIST: CPT

## 2020-05-12 PROCEDURE — G0382 LEV 3 HOSP TYPE B ED VISIT: HCPCS | Performed by: FAMILY MEDICINE

## 2020-05-12 PROCEDURE — 73090 X-RAY EXAM OF FOREARM: CPT

## 2020-05-15 ENCOUNTER — APPOINTMENT (OUTPATIENT)
Dept: URGENT CARE | Age: 40
End: 2020-05-15
Payer: OTHER MISCELLANEOUS

## 2020-05-15 PROCEDURE — 99213 OFFICE O/P EST LOW 20 MIN: CPT | Performed by: NURSE PRACTITIONER

## 2020-05-26 ENCOUNTER — APPOINTMENT (OUTPATIENT)
Dept: URGENT CARE | Age: 40
End: 2020-05-26
Payer: OTHER MISCELLANEOUS

## 2020-05-26 PROCEDURE — 99213 OFFICE O/P EST LOW 20 MIN: CPT | Performed by: NURSE PRACTITIONER

## 2020-06-18 ENCOUNTER — APPOINTMENT (OUTPATIENT)
Dept: URGENT CARE | Age: 40
End: 2020-06-18
Payer: OTHER MISCELLANEOUS

## 2020-06-18 PROCEDURE — 99213 OFFICE O/P EST LOW 20 MIN: CPT | Performed by: NURSE PRACTITIONER

## 2020-11-18 ENCOUNTER — TELEPHONE (OUTPATIENT)
Dept: OBGYN CLINIC | Facility: CLINIC | Age: 40
End: 2020-11-18

## 2020-11-19 ENCOUNTER — TELEPHONE (OUTPATIENT)
Dept: OBGYN CLINIC | Facility: CLINIC | Age: 40
End: 2020-11-19

## 2020-11-19 DIAGNOSIS — E28.2 PCOS (POLYCYSTIC OVARIAN SYNDROME): ICD-10-CM

## 2020-11-19 DIAGNOSIS — Z30.41 ORAL CONTRACEPTIVE PILL SURVEILLANCE: ICD-10-CM

## 2020-11-19 RX ORDER — NORGESTIMATE AND ETHINYL ESTRADIOL 0.25-0.035
1 KIT ORAL DAILY
Qty: 84 TABLET | Refills: 0 | Status: SHIPPED | OUTPATIENT
Start: 2020-11-19

## 2020-12-20 DIAGNOSIS — E28.2 PCOS (POLYCYSTIC OVARIAN SYNDROME): ICD-10-CM

## 2020-12-20 DIAGNOSIS — Z30.41 ORAL CONTRACEPTIVE PILL SURVEILLANCE: ICD-10-CM

## 2020-12-28 NOTE — TELEPHONE ENCOUNTER
Pt given 3 mo supply as she indicated she is transferring care to [facility] due to employment there   No further refills indicated

## 2021-02-16 DIAGNOSIS — Z30.41 ORAL CONTRACEPTIVE PILL SURVEILLANCE: ICD-10-CM

## 2021-02-16 DIAGNOSIS — E28.2 PCOS (POLYCYSTIC OVARIAN SYNDROME): ICD-10-CM

## 2021-02-23 NOTE — TELEPHONE ENCOUNTER
Pt was due for annual examination in October 2020 and has yet to come in and be seen  Please call her to schedule and let  Me know if she needs refills and how many until her appointment